# Patient Record
Sex: MALE | Race: WHITE
[De-identification: names, ages, dates, MRNs, and addresses within clinical notes are randomized per-mention and may not be internally consistent; named-entity substitution may affect disease eponyms.]

---

## 2018-01-30 ENCOUNTER — HOSPITAL ENCOUNTER (OUTPATIENT)
Dept: HOSPITAL 96 - M.CL | Age: 83
Setting detail: OBSERVATION
LOS: 1 days | Discharge: NURSING, REHAB OR LONG-TERM CARE | End: 2018-01-31
Attending: INTERNAL MEDICINE | Admitting: INTERNAL MEDICINE
Payer: MEDICARE

## 2018-01-30 VITALS — DIASTOLIC BLOOD PRESSURE: 51 MMHG | SYSTOLIC BLOOD PRESSURE: 91 MMHG

## 2018-01-30 VITALS — DIASTOLIC BLOOD PRESSURE: 52 MMHG | SYSTOLIC BLOOD PRESSURE: 96 MMHG

## 2018-01-30 VITALS — SYSTOLIC BLOOD PRESSURE: 92 MMHG | DIASTOLIC BLOOD PRESSURE: 49 MMHG

## 2018-01-30 VITALS — SYSTOLIC BLOOD PRESSURE: 99 MMHG | DIASTOLIC BLOOD PRESSURE: 51 MMHG

## 2018-01-30 VITALS — SYSTOLIC BLOOD PRESSURE: 110 MMHG | DIASTOLIC BLOOD PRESSURE: 63 MMHG

## 2018-01-30 VITALS — WEIGHT: 180 LBS | BODY MASS INDEX: 23.1 KG/M2 | HEIGHT: 74.02 IN

## 2018-01-30 VITALS — DIASTOLIC BLOOD PRESSURE: 74 MMHG | SYSTOLIC BLOOD PRESSURE: 110 MMHG

## 2018-01-30 VITALS — SYSTOLIC BLOOD PRESSURE: 109 MMHG | DIASTOLIC BLOOD PRESSURE: 68 MMHG

## 2018-01-30 VITALS — SYSTOLIC BLOOD PRESSURE: 139 MMHG | DIASTOLIC BLOOD PRESSURE: 94 MMHG

## 2018-01-30 VITALS — DIASTOLIC BLOOD PRESSURE: 48 MMHG | SYSTOLIC BLOOD PRESSURE: 98 MMHG

## 2018-01-30 VITALS — SYSTOLIC BLOOD PRESSURE: 91 MMHG | DIASTOLIC BLOOD PRESSURE: 53 MMHG
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
## 2018-01-30 DIAGNOSIS — I44.2: Primary | ICD-10-CM

## 2018-01-30 DIAGNOSIS — T82.897A: ICD-10-CM

## 2018-01-30 LAB
ALBUMIN SERPL-MCNC: 3.3 G/DL (ref 3.4–5)
ALP SERPL-CCNC: 99 U/L (ref 46–116)
ALT SERPL-CCNC: 14 U/L (ref 30–65)
ANION GAP SERPL CALC-SCNC: 5 MMOL/L (ref 7–16)
APTT BLD: 25.9 SECONDS (ref 25–31.3)
AST SERPL-CCNC: 18 U/L (ref 15–37)
BILIRUB SERPL-MCNC: 0.4 MG/DL
BUN SERPL-MCNC: 18 MG/DL (ref 7–18)
CALCIUM SERPL-MCNC: 8.6 MG/DL (ref 8.5–10.1)
CHLORIDE SERPL-SCNC: 106 MMOL/L (ref 98–107)
CO2 SERPL-SCNC: 31 MMOL/L (ref 21–32)
CREAT SERPL-MCNC: 1.4 MG/DL (ref 0.6–1.3)
GLUCOSE SERPL-MCNC: 114 MG/DL (ref 70–99)
HCT VFR BLD CALC: 40.2 % (ref 42–52)
HGB BLD-MCNC: 13 GM/DL (ref 14–18)
INR PPP: 1
MCH RBC QN AUTO: 31.3 PG (ref 26–34)
MCHC RBC AUTO-ENTMCNC: 32.3 G/DL (ref 28–37)
MCV RBC: 96.9 FL (ref 80–100)
MPV: 10.6 FL. (ref 7.2–11.1)
PLATELET COUNT*: 202 THOU/UL (ref 150–400)
POTASSIUM SERPL-SCNC: 4.3 MMOL/L (ref 3.5–5.1)
PROT SERPL-MCNC: 7 G/DL (ref 6.4–8.2)
PROTHROMBIN TIME: 10.2 SECONDS (ref 9.2–11.5)
RBC # BLD AUTO: 4.14 MIL/UL (ref 4.5–6)
RDW-CV: 13.8 % (ref 10.5–14.5)
SODIUM SERPL-SCNC: 142 MMOL/L (ref 136–145)
WBC # BLD AUTO: 9.5 THOU/UL (ref 4–11)

## 2018-01-30 NOTE — EKG
[place] [address]
Phone:  [phone]                     ELECTROCARDIOGRAM REPORT      
_______________________________________________________________________________
 
Name:       YENIFER DUNNE              Room:                      [facility]#:  [id number]      Account #:      [id number]  
Admission:  18     Attend Phys:    Leonid Lemus MD
Discharge:               Date of Birth:  [birth year]  
         Report #: [id number]
    [id number]
_______________________________________________________________________________
THIS REPORT FOR:  //name//                      
 
                          [facility]
                                       
Test Date:    2018               Test Time:    09:06:24
Pat Name:     YENIFER DUNNE          Department:   
Patient ID:   [id number]            Room:          
Gender:       M                        Technician:   CONRADO
:          1934               Requested By: Cooper Payne
Order Number: [id number]    Reading MD:   Cooper Payne
                                 Measurements
Intervals                              Axis          
Rate:         68                       P:            0
AK:           163                      QRS:          -81
QRSD:         284                      T:            98
QT:           583                                    
QTc:          621                                    
                           Interpretive Statements
A-V dual-paced rhythm with some inhibition
No further analysis attempted due to paced rhythm
Compared to ECG 2016 11:49:36
No significant changes
 
Electronically Signed On 2018 16:25:38 CST by Cooper Payne
[email or web address]
 
 
 
 
 
 
 
 
 
 
 
 
 
 
 
 
 
 
  <ELECTRONICALLY SIGNED>
                                           By: Cooper Payne MD, [facility]   
  18     1625
D: 1806   _____________________________________
T: 18   Cooper Payne MD, [facility]     /EPI

## 2018-01-31 VITALS — DIASTOLIC BLOOD PRESSURE: 45 MMHG | SYSTOLIC BLOOD PRESSURE: 87 MMHG

## 2018-01-31 VITALS — DIASTOLIC BLOOD PRESSURE: 48 MMHG | SYSTOLIC BLOOD PRESSURE: 92 MMHG

## 2018-01-31 VITALS — SYSTOLIC BLOOD PRESSURE: 87 MMHG | DIASTOLIC BLOOD PRESSURE: 54 MMHG

## 2018-01-31 VITALS — SYSTOLIC BLOOD PRESSURE: 94 MMHG | DIASTOLIC BLOOD PRESSURE: 56 MMHG

## 2018-01-31 NOTE — EKG
[place] [address]
Phone:  [phone]                     ELECTROCARDIOGRAM REPORT      
_______________________________________________________________________________
 
Name:       YENIFER DUNNE              Room:           [address]    ADM IN  
M.R.#:  [id number]      Account #:      [id number]  
Admission:  18     Attend Phys:    Cooper Payne MD
Discharge:               Date of Birth:  [birth year]  
         Report #: [id number]
    [id number]
_______________________________________________________________________________
THIS REPORT FOR:  //name//                      
 
                          [facility]
                                       
Test Date:    2018               Test Time:    04:38:43
Pat Name:     YENIFER DUNNE          Department:   
Patient ID:   [id number]            Room:         [address]
Gender:       M                        Technician:   PE
:          1934               Requested By: Jose Murphy
Order Number: [id number]    Karolina MD:   Jose Murphy
                                 Measurements
Intervals                              Axis          
Rate:         74                       P:            
ID:           138                      QRS:          -52
QRSD:         213                      T:            112
QT:           519                                    
QTc:          576                                    
                           Interpretive Statements
ventricular-paced complexes
 
Compared to ECG 2018 09:06:24
no change
 
Electronically Signed On 2018 13:09:14 CST by Jose Murphy
[email or web address]
 
 
 
 
 
 
 
 
 
 
 
 
 
 
 
 
 
 
  <ELECTRONICALLY SIGNED>
                                           By: Jose Murphy MD, [facility]      
  18     1309
D: 18 0438   _____________________________________
T: 18 0438   Jose Murphy MD, FACC        /EPI

## 2018-01-31 NOTE — NUR
Pt discharging back to the [facility] in [place] today. Faxed dc
orders. Chart copied. Nurse report number provided, [phone]. Updated Pt's
Public , Nohemi Childers of disposition. Waiting for facility to
call back with  time. Following.

## 2018-01-31 NOTE — NUR
TOOK OVER CARE OF PT AT 0100, PT WAS SLEPING, IV FLUIDS NOTED TO BE INFUSING,
LEFT ARM IN SLING, ABLE TO SEE DRESSING TO THE UPPER LEFT CHEST WHICH WAS
C/D/I, 2L NC, V-PACED ON THE MONITOR, NURSE PROVIDING ME REPORT STATED PT'S BP
RUN SOFT IN LOW 90'S/50'S, PT 0400 BP SOFT, PT ASYMPTOMATIC, PT REC PAIN MED
FOR SORENESS TO INCISIONAL SITE X1, HOURLY ROUNING IN PLACE, FALL PRECAUIONS
IN PLACE, WILL CONT TO MONITOR.

## 2018-01-31 NOTE — NUR
RECEIVED REPORT FROM NOC RN. PT A & O X4. ABLE TO COMMUNICATE NEEDS TO STAFF.
VS OBTAINED. ASSESSMENT COMPLETED. MEDS PER MAR. M/S STATUS. DC ORDERS
COMPLETE. IV & TELE MONITOR DC'D. DC INSTRUCTIONS AND MED LIST PROCESSED AND
INCLUDED IN PACKET THAT WAS GIVEN TO TRANSPORTATION PERSONNEL. PT LEFT UNIT AT
1340 VIA WC AND TRANSPORTER. PT TAKEN VIA WC VAN TO [facility] IN [place].

## 2018-02-12 NOTE — D
[facility] 
[address] [place]  [address]                    DISCHARGE SUMMARY             
_______________________________________________________________________________
 
Name:       YENIFER DUNNE              Room:           [address] Ritu 
MElisRElis#:  [id number]      Account #:      [id number]  
Admission:  01/30/18     Attend Phys:    Cooper Payne MD
Discharge:  01/31/18     Date of Birth:  [birth year]  
         Report #: [id number]
                                                                     [id number]  
_______________________________________________________________________________
THIS REPORT FOR:  //name//                      
 
CC: Leonid Lemus MD [facility]
    [facility]
 
DISCHARGE DIAGNOSES:
1.  Complete heart block.
2.  Right ventricular lead failure.
 
PROCEDURES DURING THE HOSPITALIZATION:  RV lead revision.
 
HOSPITAL COURSE:  The patient was brought to the hospital for revision of a
failing right ventricular lead.  The patient was brought to the interventional
radiology lab.  The device was explanted.  The right ventricular lead was capped
and a new RV lead placed.  The patient had dislodgement of the lead
post-procedure requiring returning to the IR lab for readjustment and
positioning of the RV lead.  Ultimately, a satisfactory position was achieved. 
The patient was kept in the hospital overnight.  Chest x-ray showed no evidence
of pneumothorax.  Thresholds were checked the day following implant and were
deemed to be satisfactory.  The patient was returned to his assisted care
facility in stable condition.
 
DISCHARGE MEDICATIONS:  Include Tylenol 650 mg p.r.n., amiodarone 200 mg daily,
carvedilol 6.25 mg b.i.d., vitamin D 1000 units daily, digoxin 0.125 mg daily,
Lexapro 20 mg daily, furosemide 40 mg daily, gabapentin 300 mg daily, Vicodin
5/300 q.6 hours p.r.n., Vistaril 25 mg 2 tablets q.6 hours, Lantus per sliding
scale, lamotrigine 100 mg 1-1/2 tablets b.i.d., Claritin 10 mg daily, magnesium
hydroxide 30 mL p.r.n., Maalox p.r.n., niacin 500 mg daily, Nitrostat p.r.n.,
potassium chloride 20 mEq b.i.d., Zantac 150 mg daily, trazodone 50 mg p.r.n.,
venlafaxine  mg daily, aspirin 325 mg daily, and Plavix 75 mg daily.
 
DISPOSITION:  The patient will follow up with site check in 1 week and per
pacemaker routine.
 
 
 
 
 
 
 
 
 
<ELECTRONICALLY SIGNED>
                                        By:  Cooper Payne MD, FACC   
02/12/18     0838
D: 01/31/18 0932_______________________________________
T: 01/31/18 1055Micgloria Payne MD, FACC      /nt

## 2018-02-14 NOTE — CARD
[address]  [address]                    CARDIAC CATH REPORT           
_______________________________________________________________________________
 
Name:       YENIFER DUNNE              Room:           [address] Ritu BAILEY#:  [id number]      Account #:      [id number]  
Admission:  01/30/18     Attend Phys:    Cooper Payne MD
Discharge:  01/31/18     Date of Birth:  [birth year]  
         Report #: [id number]
                                                                     [id number]
_______________________________________________________________________________
THIS REPORT FOR:  //name//                      
 
 
--------------- APPROVED REPORT --------------
 
 
Patient Status: OP       Room #: 
Event Personnel: Cooper Payne Cardiologist, Alise Vasquez 
Monitor, Monique Gavin RTR Scrub, Silvia Calixto RN Circulator, 
Savannah Grimaldo RN Circulator
Exam: RV lead revision 
Indications: dislodgment of existing right ventricular lead after 
implantation
 
The patient is a 83 year-old male with a history of complete heart 
block status post recent RV lead replacement and dual-chamber pulse 
generator replacement.
 
Conscious Sedation
Fentanyl  75 mcg Versed  1 mg  
 
Procedure
The patient underwent informed consent. We discussed the details of 
the procedure including the risks, which include, but not limited to 
bleeding, infection, vascular damage, cardiac perforation, and 
pneumothorax. 
The patient underwent dual chamber pulse generator replacement and 
elective right ventricular lead revision. On the day of revision he 
was noted to have intermittent pacemaker capture. Interrogation 
revealed acute decline in capture threshold. X-ray revealed 
dislodgment of the right ventricular lead. The patient was brought 
back to the interventional radiology lab for RV lead revision. 
Informed consent was obtained. The area of the left chest was again 
prepped and draped in sterile fashion. The existing subcuticular 
stitches and the sutures were removed. The pulse generator was 
explanted. The pacemaker pocket was flushed with antibody solution. 
The RV lead was detached from the dual chamber pulse generator. The 
chronic failing RV lead was utilized intermittently for temporary 
pacing. The new RV lead was then repositioned to the right 
ventricular apex. A stable position was achieved. Adequate thresholds 
were achieved. The RV lead was then secured again in the pacemaker 
pocket using the designated cuffs and 2-0 silk suture. The RV lead 
was then reattached to the dual-chamber pulse generator. Pulse 
generator and redundant lead were then replaced within the device 
pocket. The deep tissues were closed using interrupted stitches of 
2-0 Vicryl. The skin incision was then closed with a single 
 
 
 
[place] [address]                    CARDIAC CATH REPORT           
_______________________________________________________________________________
 
Name:       YENIFER DUNNE              Room:           [address] Ritu BAILEY#:  [id number]      Account #:      [id number]  
Admission:  01/30/18     Attend Phys:    Cooper Payne MD
Discharge:  01/31/18     Date of Birth:  [birth year]  
         Report #: [id number]
                                                                     [id number]
_______________________________________________________________________________
subcuticular stitch of 4-0 Vicryl. Several Steri-Strips were placed 
across the incision. A sterile Telfa dressing was then covered with a 
Tegaderm. The patient tolerated the procedure well without 
complication.
 
Conclusion
1. RV lead dislodgment after initial revision.
2. Successful relocation of the right ventricular lead into a stable 
position in the RV apex. 
 
Recommendations
1. Keep follow-up as scheduled.
 
 
 
 
 
 
 
 
 
 
 
 
 
 
 
 
 
 
 
 
 
 
 
 
 
 
 
 
 
 
 
 
<ELECTRONICALLY SIGNED>
                                        By:  Cooper Payne MD, [facility]   
02/14/18 0721
D: 02/14/18 0721_______________________________________
T: 02/14/18 0721Michaeсергей Payne MD, FACC      /INF

## 2018-02-14 NOTE — H
[place] [address]                    HISTORY AND PHYSICAL          
_______________________________________________________________________________
 
Name:       YENIFER DUNNE              Room:           [address] Ritu BAILEY#:  [id number]      Account #:      [id number]  
Admission:  01/30/18     Attend Phys:    Cooper Payne MD
Discharge:  01/31/18     Date of Birth:  [birth year]  
         Report #: [id number]
                                                                     [id number]  
_______________________________________________________________________________
THIS REPORT FOR:  //name//                      
 
CC: DR LANI Anguiano
 
INDICATION:  Dual chamber pulse generator at elective replacement with failing
RV lead.
 
HISTORY OF PRESENT ILLNESS:  The patient is a very pleasant gentleman who has a
history of complete heart block, status post dual chamber pacemaker placement. 
He has been noted to have failing right ventricular lead for some time.  He now
is at elective replacement for his dual chamber pulse generator.  The patient is
being admitted with planned dual chamber pulse generator replacement with RV
lead revision.
 
PAST MEDICAL HISTORY:
1.  Paroxysmal atrial fibrillation.
2.  Complete heart block.
3.  Coronary artery disease.
4.  Ischemic cardiomyopathy.
5.  Hypertension.
6.  Type 2 diabetes mellitus.
7.  COPD.
8.  Chronic renal insufficiency.
 
PAST SURGICAL HISTORY:
1.  Groin surgery on the left side.
2.  Appendectomy.
3.  Hip replacement.
4.  Initial pacemaker placement in 2007, with generator change in 2016.
 
FAMILY HISTORY:  Positive for coronary artery disease.
 
SOCIAL HISTORY:  The patient is a current smoker, smoking a carton of cigarettes
per month.  He does not drink alcohol.
 
ALLERGIES:  None documented.
 
CURRENT MEDICATIONS:  Amiodarone 200 mg daily, aspirin 325 mg daily, carvedilol
6.25 mg b.i.d., Plavix 75 mg daily, digoxin 125 mcg daily,
hydrocodone/acetaminophen 5/325 one tablet q.6 hours p.r.n.,  Lamictal 150 mg
b.i.d., Lantus 100 units as designated, Lasix 40 mg daily, Lexapro 20 mg daily,
milk of magnesia p.r.n., niacin 500 mg daily, Nitrostat p.r.n., potassium
 
 
 
[place] [address]                    HISTORY AND PHYSICAL          
_______________________________________________________________________________
 
Name:       YENIFER DUNNE BRITTNEY              Room:           [address] 
LYNN#:  [id number]      Account #:      [id number]  
Admission:  01/30/18     Attend Phys:    Cooper Payne MD
Discharge:  01/31/18     Date of Birth:  [birth year]  
         Report #: [id number]
                                                                     [id number]  
_______________________________________________________________________________
chloride 20 mEq daily, ranitidine 150 mg daily, trazodone 100 mg daily, Tylenol
p.r.n., vitamin D 1000 units daily, gabapentin 300 mg daily, Maalox p.r.n.,
Abilify 2 mg daily, Combivent inhaler 4 times daily, Tums p.r.n.
 
REVIEW OF SYSTEMS:  The patient denies fevers, chills, sweats.  He has no weight
loss.  Denies any lower extremity edema.  Denies headache, dizziness or
lightheadedness.  He has an occasional cough that is nonproductive.  He denies
chest pain, tightness or palpitations.  He is not having orthopnea.  He denies
nausea, vomiting, melena, hematochezia, jaundice or hepatitis.
GENITOURINARY:  There is no dysuria or hematuria.
He denies any bleeding or anemia.  No history of cancer.  He has no significant
medical or seasonal allergies.  Denies acute depression or anxiety.  He has
arthritis without connective tissue disease.
 
PHYSICAL EXAMINATION:
VITAL SIGNS:  Blood pressure 98/67, pulse 60 and regular.
GENERAL:  This is a pleasant elderly gentleman in no distress.  Mood and affect
appropriate.
HEENT:  Extraocular muscles intact.  Mucous membranes are moist.
NECK:  Shows no jugular venous distention.  There are no carotid bruits.
CHEST:  Reveals clear lung fields without wheezes, rales or rhonchi.
CARDIOVASCULAR:  Reveals a regular rhythm.  I do not appreciate gallop or
murmur.
ABDOMEN:  Reveals normal bowel sounds.  The abdomen is soft, nontender.
EXTREMITIES:  Show no edema.  Peripheral pulses palpable.
 
IMPRESSION:
1.  Dual chamber pulse generator at elective replacement.
2.  Right ventricular lead failure.
3.  Coronary artery disease, stable.
 
The patient is being brought to the cardiac catheterization lab for elective
replacement of dual chamber pulse generator and RV lead revision.
 
 
 
 
 
 
 
 
 
 
 
<ELECTRONICALLY SIGNED>
                                        By:  Cooper Payne MD, FACC   
02/14/18     0921
D: 02/14/18 0729_______________________________________
T: 02/14/18 0839Micgloria Payne MD, FACC      /nt